# Patient Record
Sex: MALE | Race: BLACK OR AFRICAN AMERICAN | NOT HISPANIC OR LATINO | Employment: FULL TIME | URBAN - METROPOLITAN AREA
[De-identification: names, ages, dates, MRNs, and addresses within clinical notes are randomized per-mention and may not be internally consistent; named-entity substitution may affect disease eponyms.]

---

## 2021-05-05 ENCOUNTER — OFFICE VISIT (OUTPATIENT)
Dept: INTERNAL MEDICINE | Facility: CLINIC | Age: 46
End: 2021-05-05

## 2021-05-05 VITALS
HEART RATE: 95 BPM | TEMPERATURE: 97.5 F | DIASTOLIC BLOOD PRESSURE: 76 MMHG | RESPIRATION RATE: 18 BRPM | WEIGHT: 163 LBS | HEIGHT: 73 IN | OXYGEN SATURATION: 96 % | SYSTOLIC BLOOD PRESSURE: 120 MMHG | BODY MASS INDEX: 21.6 KG/M2

## 2021-05-05 DIAGNOSIS — K64.4 EXTERNAL HEMORRHOIDS: Primary | ICD-10-CM

## 2021-05-05 DIAGNOSIS — M25.511 CHRONIC PAIN OF BOTH SHOULDERS: ICD-10-CM

## 2021-05-05 DIAGNOSIS — M25.512 CHRONIC PAIN OF BOTH SHOULDERS: ICD-10-CM

## 2021-05-05 DIAGNOSIS — G89.29 CHRONIC PAIN OF BOTH SHOULDERS: ICD-10-CM

## 2021-05-05 PROCEDURE — 99204 OFFICE O/P NEW MOD 45 MIN: CPT | Performed by: NURSE PRACTITIONER

## 2021-05-05 RX ORDER — PRAMOXINE HYDROCHLORIDE 10 MG/G
AEROSOL, FOAM TOPICAL
Qty: 15 G | Refills: 2 | Status: SHIPPED | OUTPATIENT
Start: 2021-05-05 | End: 2021-05-06 | Stop reason: SDUPTHER

## 2021-05-05 RX ORDER — POLYETHYLENE GLYCOL 3350 17 G/17G
17 POWDER, FOR SOLUTION ORAL DAILY
Qty: 30 PACKET | Refills: 2 | Status: SHIPPED | OUTPATIENT
Start: 2021-05-05 | End: 2021-05-06 | Stop reason: SDUPTHER

## 2021-05-05 NOTE — PATIENT INSTRUCTIONS
Hemorrhoids  Hemorrhoids are swollen veins that may develop:  · In the butt (rectum). These are called internal hemorrhoids.  · Around the opening of the butt (anus). These are called external hemorrhoids.  Hemorrhoids can cause pain, itching, or bleeding. Most of the time, they do not cause serious problems. They usually get better with diet changes, lifestyle changes, and other home treatments.  What are the causes?  This condition may be caused by:  · Having trouble pooping (constipation).  · Pushing hard (straining) to poop.  · Watery poop (diarrhea).  · Pregnancy.  · Being very overweight (obese).  · Sitting for long periods of time.  · Heavy lifting or other activity that causes you to strain.  · Anal sex.  · Riding a bike for a long period of time.  What are the signs or symptoms?  Symptoms of this condition include:  · Pain.  · Itching or soreness in the butt.  · Bleeding from the butt.  · Leaking poop.  · Swelling in the area.  · One or more lumps around the opening of your butt.  How is this diagnosed?  A doctor can often diagnose this condition by looking at the affected area. The doctor may also:  · Do an exam that involves feeling the area with a gloved hand (digital rectal exam).  · Examine the area inside your butt using a small tube (anoscope).  · Order blood tests. This may be done if you have lost a lot of blood.  · Have you get a test that involves looking inside the colon using a flexible tube with a camera on the end (sigmoidoscopy or colonoscopy).  How is this treated?  This condition can usually be treated at home. Your doctor may tell you to change what you eat, make lifestyle changes, or try home treatments. If these do not help, procedures can be done to remove the hemorrhoids or make them smaller. These may involve:  · Placing rubber bands at the base of the hemorrhoids to cut off their blood supply.  · Injecting medicine into the hemorrhoids to shrink them.  · Shining a type of light  energy onto the hemorrhoids to cause them to fall off.  · Doing surgery to remove the hemorrhoids or cut off their blood supply.  Follow these instructions at home:  Eating and drinking    · Eat foods that have a lot of fiber in them. These include whole grains, beans, nuts, fruits, and vegetables.  · Ask your doctor about taking products that have added fiber (fibersupplements).  · Reduce the amount of fat in your diet. You can do this by:  ? Eating low-fat dairy products.  ? Eating less red meat.  ? Avoiding processed foods.  · Drink enough fluid to keep your pee (urine) pale yellow.  Managing pain and swelling    · Take a warm-water bath (sitz bath) for 20 minutes to ease pain. Do this 3-4 times a day. You may do this in a bathtub or using a portable sitz bath that fits over the toilet.  · If told, put ice on the painful area. It may be helpful to use ice between your warm baths.  ? Put ice in a plastic bag.  ? Place a towel between your skin and the bag.  ? Leave the ice on for 20 minutes, 2-3 times a day.  General instructions  · Take over-the-counter and prescription medicines only as told by your doctor.  ? Medicated creams and medicines may be used as told.  · Exercise often. Ask your doctor how much and what kind of exercise is best for you.  · Go to the bathroom when you have the urge to poop. Do not wait.  · Avoid pushing too hard when you poop.  · Keep your butt dry and clean. Use wet toilet paper or moist towelettes after pooping.  · Do not sit on the toilet for a long time.  · Keep all follow-up visits as told by your doctor. This is important.  Contact a doctor if you:  · Have pain and swelling that do not get better with treatment or medicine.  · Have trouble pooping.  · Cannot poop.  · Have pain or swelling outside the area of the hemorrhoids.  Get help right away if you have:  · Bleeding that will not stop.  Summary  · Hemorrhoids are swollen veins in the butt or around the opening of the  butt.  · They can cause pain, itching, or bleeding.  · Eat foods that have a lot of fiber in them. These include whole grains, beans, nuts, fruits, and vegetables.  · Take a warm-water bath (sitz bath) for 20 minutes to ease pain. Do this 3-4 times a day.  This information is not intended to replace advice given to you by your health care provider. Make sure you discuss any questions you have with your health care provider.  Document Revised: 12/26/2019 Document Reviewed: 05/09/2019  Elsevier Patient Education © 2021 [employer]

## 2021-05-05 NOTE — PROGRESS NOTES
"Subjective   Chief Complaint   Patient presents with   • Hemorrhoids     x10 years and its getting worse.    • Shoulder Pain     1 1/2 years bilateral shoulder        Claudio Mobley is a 46 y.o. male here today as a new patient to establish care.  Moved to [place] a year and half ago so he's needing a new PCP.  Pt complains of internal and external hemorrhoids. He states he has had them for about 10 years but they are getting worse.  He would like to have them removed.  He states he doesn't even want to have a bowel movement because the pain is so bad.   Pt also complains of bilateral shoulder pain.  He states the pain is intermittent.  Worse with moving certain ways but the pain has woke him up from his sleep. He had XRays done over a year ago prior to moving to [place] and he was told \"the bones were normal\". The pain has been intermittent for \"quite some time\" but the past year and half it seems to get worse.  He takes Aleve as needed which does seem to help.  He states when he raises his arms up the pain is worse.  The right is worse than the left.        Review of Systems   Constitutional: Negative for activity change, appetite change and fatigue.   HENT: Negative for congestion.    Respiratory: Negative for cough and shortness of breath.    Cardiovascular: Negative for chest pain and leg swelling.   Gastrointestinal: Positive for rectal pain (\"hemorrhoids\"). Negative for abdominal pain.   Musculoskeletal: Positive for arthralgias (both shoulders).   Neurological: Negative for dizziness, weakness and confusion.   Psychiatric/Behavioral: Negative for behavioral problems and decreased concentration.       History reviewed. No pertinent past medical history.  Past Surgical History:   Procedure Laterality Date   • EYE SURGERY  05/01/1993    left eye      Family History   Problem Relation Age of Onset   • Anxiety disorder Daughter    • Diabetes Maternal Aunt    • Heart attack Maternal Aunt    • Obesity Maternal Aunt    • " "Hypertension Maternal Grandmother    • Cancer Maternal Grandfather    • Liver disease Maternal Grandfather    • Liver disease Paternal Grandfather      Social History     Tobacco Use   Smoking Status Current Every Day Smoker   • Packs/day: 4.00   • Years: 20.00   • Pack years: 80.00   • Types: Cigars   Smokeless Tobacco Never Used      Social History     Substance and Sexual Activity   Alcohol Use Yes    Comment: social       No current outpatient medications on file prior to visit.     No current facility-administered medications on file prior to visit.     No Known Allergies    Objective   Vitals:    05/05/21 0833   BP: 120/76   Pulse: 95   Resp: 18   Temp: 97.5 °F (36.4 °C)   TempSrc: Temporal   SpO2: 96%   Weight: 73.9 kg (163 lb)   Height: 184.2 cm (72.5\")   PainSc:   6   PainLoc: Shoulder  Comment: hemorrhoids and bilateral shoulders     Body mass index is 21.8 kg/m².    Physical Exam  Vitals and nursing note reviewed.   HENT:      Head: Normocephalic.   Eyes:      Pupils: Pupils are equal, round, and reactive to light.   Cardiovascular:      Rate and Rhythm: Normal rate and regular rhythm.      Pulses: Normal pulses.      Heart sounds: Normal heart sounds.   Pulmonary:      Effort: Pulmonary effort is normal.      Breath sounds: Normal breath sounds.   Musculoskeletal:      Right shoulder: Crepitus present. No deformity or bony tenderness. Normal range of motion.      Left shoulder: No deformity or bony tenderness. Normal range of motion.      Comments: Pain in shoulders with raising both arms above head   Skin:     General: Skin is warm and dry.      Capillary Refill: Capillary refill takes less than 2 seconds.   Neurological:      Mental Status: He is alert and oriented to person, place, and time.   Psychiatric:         Behavior: Behavior normal.         Assessment/Plan   Problem List Items Addressed This Visit     None      Visit Diagnoses     External hemorrhoids    -  Primary    Relevant Medications    " polyethylene glycol (MIRALAX) 17 g packet    Pramoxine HCl, Perianal, (Proctofoam) 1 % foam    Other Relevant Orders    Ambulatory Referral to General Surgery    Chronic pain of both shoulders        Relevant Orders    Ambulatory Referral to Orthopedic Surgery         Refer to general surgery for hemorrhoids  Miralax to avoid straining  Proctofoam for discomfort  Refer to ortho   Schedule physical      Current Outpatient Medications:   •  polyethylene glycol (MIRALAX) 17 g packet, Take 17 g by mouth Daily., Disp: 30 packet, Rfl: 2  •  Pramoxine HCl, Perianal, (Proctofoam) 1 % foam, Insert  into the rectum Every 2 (Two) Hours As Needed for Hemorrhoids for up to 30 days., Disp: 15 g, Rfl: 2       Plan of care reviewed with the patient at the conclusion of today's visit.  Education was provided regarding diagnosis, management, and any prescribed or recommended OTC medications.  Patient verbalized understanding of and agreement with management plan.     Return in about 4 weeks (around 6/2/2021) for Annual.      ANTIONE Garcia

## 2021-05-06 ENCOUNTER — TELEPHONE (OUTPATIENT)
Dept: INTERNAL MEDICINE | Facility: CLINIC | Age: 46
End: 2021-05-06

## 2021-05-06 DIAGNOSIS — K64.4 EXTERNAL HEMORRHOIDS: ICD-10-CM

## 2021-05-06 RX ORDER — PRAMOXINE HYDROCHLORIDE 10 MG/G
AEROSOL, FOAM TOPICAL
Qty: 15 G | Refills: 2 | Status: CANCELLED | OUTPATIENT
Start: 2021-05-06 | End: 2021-06-05

## 2021-05-06 RX ORDER — POLYETHYLENE GLYCOL 3350 17 G/17G
17 POWDER, FOR SOLUTION ORAL DAILY
Qty: 30 PACKET | Refills: 2 | Status: CANCELLED | OUTPATIENT
Start: 2021-05-06

## 2021-05-06 RX ORDER — PRAMOXINE HYDROCHLORIDE 10 MG/G
AEROSOL, FOAM TOPICAL
Qty: 15 G | Refills: 2 | Status: SHIPPED | OUTPATIENT
Start: 2021-05-06 | End: 2021-05-06

## 2021-05-06 RX ORDER — DIAPER,BRIEF,INFANT-TODD,DISP
EACH MISCELLANEOUS 2 TIMES DAILY
Qty: 56 G | Refills: 1 | Status: SHIPPED | OUTPATIENT
Start: 2021-05-06 | End: 2021-07-15

## 2021-05-06 RX ORDER — COCOA BUTTER, PHENYLEPHRINE HYDROCHLORIDE 2211; 6.25 MG/1; MG/1
1 SUPPOSITORY RECTAL AS NEEDED
Qty: 20 SUPPOSITORY | Refills: 1 | Status: SHIPPED | OUTPATIENT
Start: 2021-05-06 | End: 2021-07-15

## 2021-05-06 RX ORDER — POLYETHYLENE GLYCOL 3350 17 G/17G
17 POWDER, FOR SOLUTION ORAL DAILY
Qty: 30 PACKET | Refills: 2 | Status: SHIPPED | OUTPATIENT
Start: 2021-05-06 | End: 2021-07-15

## 2021-05-06 NOTE — TELEPHONE ENCOUNTER
Caller: ANNE MARIE AMARO    Relationship: Emergency Contact    Best call back number: [phone]    What medications are you currently taking:   Current Outpatient Medications on File Prior to Visit   Medication Sig Dispense Refill   • polyethylene glycol (MIRALAX) 17 g packet Take 17 g by mouth Daily. 30 packet 2   • Pramoxine HCl, Perianal, (Proctofoam) 1 % foam Insert  into the rectum Every 2 (Two) Hours As Needed for Hemorrhoids for up to 30 days. 15 g 2     No current facility-administered medications on file prior to visit.        Which medication are you concerned about: polyethylene glycol (MIRALAX) 17 g packet, Pramoxine HCl, Perianal, (Proctofoam) 1 % foam    Who prescribed you this medication: [facility]     What are your concerns: PATIENT WOULD LIKE THESE MEDICATION SENT TO [facility] [employer]

## 2021-05-10 ENCOUNTER — PRIOR AUTHORIZATION (OUTPATIENT)
Dept: INTERNAL MEDICINE | Facility: CLINIC | Age: 46
End: 2021-05-10

## 2021-05-17 ENCOUNTER — OFFICE VISIT (OUTPATIENT)
Dept: ORTHOPEDIC SURGERY | Facility: CLINIC | Age: 46
End: 2021-05-17

## 2021-05-17 VITALS
HEIGHT: 73 IN | HEART RATE: 87 BPM | WEIGHT: 163 LBS | SYSTOLIC BLOOD PRESSURE: 161 MMHG | DIASTOLIC BLOOD PRESSURE: 88 MMHG | BODY MASS INDEX: 21.6 KG/M2

## 2021-05-17 DIAGNOSIS — M25.512 CHRONIC PAIN OF BOTH SHOULDERS: Primary | ICD-10-CM

## 2021-05-17 DIAGNOSIS — M19.012 OSTEOARTHRITIS OF AC (ACROMIOCLAVICULAR) JOINTS, BILATERAL: ICD-10-CM

## 2021-05-17 DIAGNOSIS — Z72.0 TOBACCO ABUSE: ICD-10-CM

## 2021-05-17 DIAGNOSIS — G89.29 CHRONIC PAIN OF BOTH SHOULDERS: Primary | ICD-10-CM

## 2021-05-17 DIAGNOSIS — M67.912 DISORDER OF ROTATOR CUFF OF BOTH SHOULDERS: ICD-10-CM

## 2021-05-17 DIAGNOSIS — M25.511 CHRONIC PAIN OF BOTH SHOULDERS: Primary | ICD-10-CM

## 2021-05-17 DIAGNOSIS — M19.011 OSTEOARTHRITIS OF AC (ACROMIOCLAVICULAR) JOINTS, BILATERAL: ICD-10-CM

## 2021-05-17 DIAGNOSIS — M67.911 DISORDER OF ROTATOR CUFF OF BOTH SHOULDERS: ICD-10-CM

## 2021-05-17 PROCEDURE — 99214 OFFICE O/P EST MOD 30 MIN: CPT | Performed by: PHYSICIAN ASSISTANT

## 2021-05-17 RX ORDER — MELOXICAM 15 MG/1
TABLET ORAL
Qty: 60 TABLET | Refills: 2 | Status: SHIPPED | OUTPATIENT
Start: 2021-05-17
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
## 2021-05-17 NOTE — TELEPHONE ENCOUNTER
Prior Authorization for Pramoxine HCL(perianal 1%) foam received however noticed med was d/c'd and not on current med list. Continue with prior auth or disregard? Please advise, Thanks.

## 2021-06-01 ENCOUNTER — TREATMENT (OUTPATIENT)
Dept: PHYSICAL THERAPY | Facility: CLINIC | Age: 46
End: 2021-06-01

## 2021-06-01 DIAGNOSIS — M25.512 CHRONIC PAIN OF BOTH SHOULDERS: Primary | ICD-10-CM

## 2021-06-01 DIAGNOSIS — M25.511 CHRONIC PAIN OF BOTH SHOULDERS: Primary | ICD-10-CM

## 2021-06-01 DIAGNOSIS — G89.29 CHRONIC PAIN OF BOTH SHOULDERS: Primary | ICD-10-CM

## 2021-06-01 DIAGNOSIS — M75.101 ROTATOR CUFF SYNDROME OF BOTH SHOULDERS: ICD-10-CM

## 2021-06-01 DIAGNOSIS — M24.9 SHOULDER JOINT DERANGEMENT: ICD-10-CM

## 2021-06-01 DIAGNOSIS — M75.102 ROTATOR CUFF SYNDROME OF BOTH SHOULDERS: ICD-10-CM

## 2021-06-01 PROCEDURE — 97161 PT EVAL LOW COMPLEX 20 MIN: CPT | Performed by: PHYSICAL THERAPIST
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
## 2021-07-08 ENCOUNTER — OFFICE VISIT (OUTPATIENT)
Dept: ORTHOPEDIC SURGERY | Facility: CLINIC | Age: 46
End: 2021-07-08

## 2021-07-08 VITALS
HEART RATE: 85 BPM | HEIGHT: 73 IN | DIASTOLIC BLOOD PRESSURE: 98 MMHG | WEIGHT: 155 LBS | SYSTOLIC BLOOD PRESSURE: 155 MMHG | BODY MASS INDEX: 20.54 KG/M2

## 2021-07-08 DIAGNOSIS — G89.29 CHRONIC PAIN OF BOTH SHOULDERS: Primary | ICD-10-CM

## 2021-07-08 DIAGNOSIS — M67.912 DISORDER OF ROTATOR CUFF OF BOTH SHOULDERS: ICD-10-CM

## 2021-07-08 DIAGNOSIS — M67.911 DISORDER OF ROTATOR CUFF OF BOTH SHOULDERS: ICD-10-CM

## 2021-07-08 DIAGNOSIS — M25.511 CHRONIC PAIN OF BOTH SHOULDERS: Primary | ICD-10-CM

## 2021-07-08 DIAGNOSIS — M25.512 CHRONIC PAIN OF BOTH SHOULDERS: Primary | ICD-10-CM

## 2021-07-08 PROCEDURE — 99213 OFFICE O/P EST LOW 20 MIN: CPT | Performed by: PHYSICIAN ASSISTANT

## 2021-07-08 NOTE — PROGRESS NOTES
"    [facility] Note        Subjective     CC: Follow-up (6 week follow up; Chronic pain of both shoulders )      HPI    Claudio Mobley is a 46 y.o. male.  Patient returns for follow-up evaluation bilateral shoulder pain.  Unfortunately he is only seeing PT once and has not been able to start on the meloxicam due to recently undergoing hemorrhoid and sphincter surgery.  He has been working on tobacco cessation he states he now smokes only 1 to 2 cigars daily.    No change at this time to his HPI from 5/17/2021.  He still endorses a pain scale of 7/10.  Continues to note pain and popping with range of motion of the shoulder.  Has pain with movement, sleeping or laying on the affected side.    Overall, patient's symptoms are unchanged from previous evaluation.    ROS:    Constiutional:Pt denies fever, chills, nausea, or vomiting.  MSK:as above        Objective      Past Medical History  History reviewed. No pertinent past medical history.      Physical Exam  /98   Pulse 85   Ht 184.2 cm (72.52\")   Wt 70.3 kg (155 lb)   BMI 20.72 kg/m²     Body mass index is 20.72 kg/m².    Patient is well nourished and well developed.        Ortho Exam  Bilateral shoulders  Skin: Remains grossly intact without redness, warmth or swelling.  Tenderness: Positive anterior lateral shoulder, right greater than left.  Again negative ACJ's.  Motion: Active , Abd 170, ER (elbows at side) 75, IR T10.   Impingement: Neer and [place] negative.  Rotaor cuff: Adam/Empty can positive. Drop arm negative. Liff-off/modified lift-off negative. Bear hug negative.  Labrum: [place]'s negative  Biceps: Speed's negative.  ACJ: Adduction cross body negative.  Strength:  Rotator cuff 5/5.  Motor/sensory: Grossly intact C5-T1.      Imaging/Labs/EMG Reviewed:  No new imaging today.      Assessment:  1. Chronic pain of both shoulders    2. Disorder of rotator cuff of both shoulders        Plan:  1. Bilateral shoulder pain due to " rotator cuff disorder, unchanged from initial evaluation.  2. Patient just recently underwent hemorrhoid and sphincter surgery.  He has not been back to see physical therapy.  3. Also due to his surgery he has not taken the meloxicam.  4. Patient to obtain clearance from surgeon before restarting PT or starting the meloxicam.    5. Tobacco abuse--patient is working on cutting down and is gone from cigarettes to now 1 to 2 cigars/day.  He will continue to work on cessation.  6. Follow-up 2 months for repeat evaluation.  7. Questions and concerns answered.      Agnieszka Caba PA-C  07/13/21  10:46 ZACARIAST      Dragon disclaimer:  Much of this encounter note is an electronic transcription/translation of spoken language to printed text. The electronic translation of spoken language may permit erroneous, or at times, nonsensical words or phrases to be inadvertently transcribed; Although I have reviewed the note for such errors, some may still exist.

## 2021-07-15 ENCOUNTER — OFFICE VISIT (OUTPATIENT)
Dept: INTERNAL MEDICINE | Facility: CLINIC | Age: 46
End: 2021-07-15

## 2021-07-15 VITALS
SYSTOLIC BLOOD PRESSURE: 112 MMHG | WEIGHT: 159 LBS | BODY MASS INDEX: 21.07 KG/M2 | RESPIRATION RATE: 16 BRPM | TEMPERATURE: 99.3 F | OXYGEN SATURATION: 98 % | HEART RATE: 94 BPM | DIASTOLIC BLOOD PRESSURE: 68 MMHG | HEIGHT: 73 IN

## 2021-07-15 DIAGNOSIS — Z00.00 ROUTINE PHYSICAL EXAMINATION: Primary | ICD-10-CM

## 2021-07-15 DIAGNOSIS — Z13.29 SCREENING FOR THYROID DISORDER: ICD-10-CM

## 2021-07-15 DIAGNOSIS — Z13.220 SCREENING FOR LIPID DISORDERS: ICD-10-CM

## 2021-07-15 DIAGNOSIS — F17.210 CIGARETTE NICOTINE DEPENDENCE WITHOUT COMPLICATION: ICD-10-CM

## 2021-07-15 DIAGNOSIS — Z11.59 ENCOUNTER FOR HEPATITIS C SCREENING TEST FOR LOW RISK PATIENT: ICD-10-CM

## 2021-07-15 DIAGNOSIS — Z12.5 SCREENING FOR PROSTATE CANCER: ICD-10-CM

## 2021-07-15 PROCEDURE — 99396 PREV VISIT EST AGE 40-64: CPT | Performed by: NURSE PRACTITIONER

## 2021-07-15 NOTE — PROGRESS NOTES
Subjective   Chief Complaint   Patient presents with   • Annual Exam        Claudio Mobley is a 46 y.o. male here today for annual exam.    Overall healthy: Yes  Regular exercise:  Not so much the past month, recent hemorrhoid surgery  Diet is well balanced:  Yes  Vitamin Supplement:  No  Alcohol intake:  Yes, very rare   Tobacco use:  Yes, down to a couple cigarettes a day from a pack    Cardiovascular risk is low:  Very low   PSA: check today  ED or bedroom issues: No  Concern for STDs:  No  Last colon screenin, normal  Regular dental exam:  No   Regular eye exam:  No  Immunizations up to date:  Yes, still unsure about COVID vaccine  Wear a seatbelt regularly:  Yes  Wear sunscreen regularly when outdoors:  No    Review of Systems   Constitutional: Negative for activity change, appetite change and fatigue.   HENT: Negative for congestion.    Respiratory: Negative for cough and shortness of breath.    Cardiovascular: Negative for chest pain and leg swelling.   Gastrointestinal: Negative for abdominal pain.   Genitourinary: Negative for decreased libido and erectile dysfunction.   Neurological: Negative for dizziness, weakness and confusion.   Psychiatric/Behavioral: Negative for behavioral problems and decreased concentration.       The following portions of the patient's history were reviewed and updated as appropriate: allergies, current medications, past family history, past medical history, past social history, past surgical history and problem list.    History reviewed. No pertinent past medical history.  Past Surgical History:   Procedure Laterality Date   • COLON SURGERY  2021    hemmorrhoid removed; ulcer removed   • EYE SURGERY  1993    left eye    • SPHINCTEROTOMY  2021     Family History   Problem Relation Age of Onset   • Anxiety disorder Daughter    • Diabetes Maternal Aunt    • Heart attack Maternal Aunt    • Obesity Maternal Aunt    • Hypertension Maternal Grandmother    •  "Cancer Maternal Grandfather    • Liver disease Maternal Grandfather    • Liver disease Paternal Grandfather      Social History     Tobacco Use   Smoking Status Current Every Day Smoker   • Packs/day: 4.00   • Years: 20.00   • Pack years: 80.00   • Types: Cigars   Smokeless Tobacco Never Used   Tobacco Comment    in the process of trying to quit; down to 1-2 a day     Social History     Substance and Sexual Activity   Alcohol Use Yes    Comment: social      No Known Allergies    Current Outpatient Medications on File Prior to Visit   Medication Sig   • meloxicam (MOBIC) 15 MG tablet 1 PO Daily with food.   • [DISCONTINUED] hydrocortisone 0.5 % cream Apply  topically to the appropriate area as directed 2 (Two) Times a Day.   • [DISCONTINUED] phenylephrine-cocoa Butter (Preparation H) 0.25-88.44 % suppository suppository Insert 1 suppository into the rectum As Needed for Hemorrhoids.   • [DISCONTINUED] polyethylene glycol (MIRALAX) 17 g packet Take 17 g by mouth Daily.     No current facility-administered medications on file prior to visit.       Objective   Vitals:    07/15/21 0912   BP: 112/68   Pulse: 94   Resp: 16   Temp: 99.3 °F (37.4 °C)   TempSrc: Temporal   SpO2: 98%   Weight: 72.1 kg (159 lb)   Height: 184.2 cm (72.52\")     Body mass index is 21.26 kg/m².    Physical Exam  Vitals and nursing note reviewed.   HENT:      Head: Normocephalic.      Right Ear: Tympanic membrane, ear canal and external ear normal.      Left Ear: Tympanic membrane, ear canal and external ear normal.      Mouth/Throat:      Mouth: Mucous membranes are moist.      Dentition: Dental caries present.      Pharynx: Oropharynx is clear.   Eyes:      Pupils: Pupils are equal, round, and reactive to light.   Neck:      Thyroid: No thyroid mass, thyromegaly or thyroid tenderness.      Vascular: No carotid bruit.   Cardiovascular:      Rate and Rhythm: Normal rate and regular rhythm.      Pulses: Normal pulses.      Heart sounds: Normal heart " sounds.   Pulmonary:      Effort: Pulmonary effort is normal.      Breath sounds: Normal breath sounds.   Abdominal:      General: Abdomen is flat. Bowel sounds are normal. There is no distension.      Palpations: Abdomen is soft.      Tenderness: There is no abdominal tenderness. There is no right CVA tenderness or left CVA tenderness.   Musculoskeletal:      Cervical back: Normal.      Thoracic back: Normal.      Lumbar back: Normal.      Comments: Full ROM of all major joints   Skin:     General: Skin is warm and dry.      Capillary Refill: Capillary refill takes less than 2 seconds.   Neurological:      General: No focal deficit present.      Mental Status: He is alert and oriented to person, place, and time.      Cranial Nerves: Cranial nerves are intact.      Motor: Motor function is intact.      Coordination: Coordination is intact.      Gait: Gait is intact.      Deep Tendon Reflexes:      Reflex Scores:       Patellar reflexes are 2+ on the right side and 2+ on the left side.  Psychiatric:         Attention and Perception: Attention normal.         Mood and Affect: Mood normal.         Speech: Speech normal.         Behavior: Behavior normal.         Thought Content: Thought content normal.         Cognition and Memory: Cognition and memory normal.         Judgment: Judgment normal.         Patient's Body mass index is 21.26 kg/m². indicating that he is within normal range (BMI 18.5-24.9). No BMI management plan needed..       Assessment/Plan     Current Outpatient Medications:   •  meloxicam (MOBIC) 15 MG tablet, 1 PO Daily with food., Disp: 60 tablet, Rfl: 2    Problem List Items Addressed This Visit     None      Visit Diagnoses     Routine physical examination    -  Primary    Relevant Orders    CBC Auto Differential    Comprehensive Metabolic Panel    Screening for lipid disorders        Relevant Orders    Lipid Panel    Screening for thyroid disorder        Relevant Orders    TSH Rfx On Abnormal To  Free T4    Screening for prostate cancer        Relevant Orders    PSA Screen    Cigarette nicotine dependence without complication        Encounter for hepatitis C screening test for low risk patient        Relevant Orders    Hepatitis C Antibody         Return for fasting labs  Appears healthy  Down to 2-3 cigarettes daily on his own       Counseling was given to patient for the following topics: appropriate exercise, healthy eating habits and importance of self testicular exam.     Plan of care reviewed with the patient at the conclusion of today's visit.  Education was provided regarding diagnosis, management, and any prescribed or recommended OTC medications.  Patient verbalized understanding of and agreement with management plan.     Return if symptoms worsen or fail to improve.      Edu Funez, ANTIONE